# Patient Record
Sex: MALE | Race: BLACK OR AFRICAN AMERICAN
[De-identification: names, ages, dates, MRNs, and addresses within clinical notes are randomized per-mention and may not be internally consistent; named-entity substitution may affect disease eponyms.]

---

## 2018-11-19 LAB
ALBUMIN SERPL-MCNC: 3.9 G/DL (ref 3.4–5)
ALP SERPL-CCNC: 79 U/L (ref 45–117)
ALT SERPL-CCNC: 17 U/L (ref 16–61)
ANION GAP SERPL CALCULATED.3IONS-SCNC: 3 MMOL/L (ref 5–15)
APTT PPP: 27.3 SEC (ref 23.78–33.04)
BILIRUB SERPL-MCNC: 0.7 MG/DL (ref 0.2–1)
BUN SERPL-MCNC: 18 MG/DL (ref 7–18)
BUN/CREAT SERPL: 20.2
CALCIUM SERPL-MCNC: 9.1 MG/DL (ref 8.5–10.1)
CHLORIDE SERPL-SCNC: 102 MMOL/L (ref 98–107)
CO2 SERPL-SCNC: 32 MMOL/L (ref 21–32)
GLUCOSE SERPL-MCNC: 102 MG/DL (ref 74–106)
HCT VFR BLD AUTO: 47.2 % (ref 41–53)
HGB BLD-MCNC: 15.6 G/DL (ref 13.5–17.5)
INR PPP: 0.88 (ref 0.9–1.15)
MCH RBC QN AUTO: 32.7 PG (ref 28–32)
MCV RBC AUTO: 98.8 FL (ref 80–100)
POTASSIUM SERPL-SCNC: 4.6 MMOL/L (ref 3.5–5.1)
PROT SERPL-MCNC: 6.9 G/DL (ref 6.4–8.2)
PROTHROMBIN TIME: 9.5 SEC (ref 9.27–12.13)
SODIUM SERPL-SCNC: 137 MMOL/L (ref 136–145)

## 2018-11-21 ENCOUNTER — HOSPITAL ENCOUNTER (OUTPATIENT)
Dept: HOSPITAL 15 - SUR | Age: 58
Discharge: HOME | End: 2018-11-21
Attending: PODIATRIST
Payer: COMMERCIAL

## 2018-11-21 VITALS — WEIGHT: 135 LBS | HEIGHT: 67 IN | BODY MASS INDEX: 21.19 KG/M2

## 2018-11-21 DIAGNOSIS — D23.72: Primary | ICD-10-CM

## 2018-11-21 DIAGNOSIS — Z79.84: ICD-10-CM

## 2018-11-21 DIAGNOSIS — Z98.890: ICD-10-CM

## 2018-11-21 DIAGNOSIS — Z88.6: ICD-10-CM

## 2018-11-21 DIAGNOSIS — Z79.899: ICD-10-CM

## 2018-11-21 DIAGNOSIS — Z79.1: ICD-10-CM

## 2018-11-21 DIAGNOSIS — Z80.0: ICD-10-CM

## 2018-11-21 DIAGNOSIS — E11.9: ICD-10-CM

## 2018-11-21 DIAGNOSIS — J44.9: ICD-10-CM

## 2018-11-21 DIAGNOSIS — Z85.6: ICD-10-CM

## 2018-11-21 PROCEDURE — 11423 EXC H-F-NK-SP B9+MARG 2.1-3: CPT

## 2018-11-21 PROCEDURE — 85027 COMPLETE CBC AUTOMATED: CPT

## 2018-11-21 PROCEDURE — 82962 GLUCOSE BLOOD TEST: CPT

## 2018-11-21 PROCEDURE — 85007 BL SMEAR W/DIFF WBC COUNT: CPT

## 2018-11-21 PROCEDURE — 93005 ELECTROCARDIOGRAM TRACING: CPT

## 2018-11-21 PROCEDURE — 81001 URINALYSIS AUTO W/SCOPE: CPT

## 2018-11-21 PROCEDURE — 80053 COMPREHEN METABOLIC PANEL: CPT

## 2018-11-21 PROCEDURE — 85730 THROMBOPLASTIN TIME PARTIAL: CPT

## 2018-11-21 PROCEDURE — 85610 PROTHROMBIN TIME: CPT

## 2018-11-21 PROCEDURE — 36415 COLL VENOUS BLD VENIPUNCTURE: CPT

## 2018-11-28 VITALS — DIASTOLIC BLOOD PRESSURE: 86 MMHG | SYSTOLIC BLOOD PRESSURE: 119 MMHG

## 2024-12-27 ENCOUNTER — HOSPITAL ENCOUNTER (INPATIENT)
Dept: HOSPITAL 15 - ER | Age: 64
LOS: 4 days | Discharge: HOME | DRG: 871 | End: 2024-12-31
Attending: INTERNAL MEDICINE | Admitting: STUDENT IN AN ORGANIZED HEALTH CARE EDUCATION/TRAINING PROGRAM
Payer: COMMERCIAL

## 2024-12-27 VITALS
OXYGEN SATURATION: 94 % | TEMPERATURE: 98.2 F | SYSTOLIC BLOOD PRESSURE: 98 MMHG | DIASTOLIC BLOOD PRESSURE: 64 MMHG | HEART RATE: 92 BPM | RESPIRATION RATE: 24 BRPM

## 2024-12-27 VITALS — OXYGEN SATURATION: 94 % | HEART RATE: 94 BPM | RESPIRATION RATE: 24 BRPM

## 2024-12-27 VITALS — OXYGEN SATURATION: 97 % | RESPIRATION RATE: 20 BRPM | HEART RATE: 82 BPM

## 2024-12-27 VITALS — HEART RATE: 98 BPM | RESPIRATION RATE: 25 BRPM | OXYGEN SATURATION: 95 %

## 2024-12-27 VITALS — OXYGEN SATURATION: 98 % | RESPIRATION RATE: 18 BRPM | HEART RATE: 85 BPM

## 2024-12-27 VITALS — HEIGHT: 67 IN | BODY MASS INDEX: 19.38 KG/M2 | WEIGHT: 123.46 LBS

## 2024-12-27 DIAGNOSIS — F17.210: ICD-10-CM

## 2024-12-27 DIAGNOSIS — J15.9: ICD-10-CM

## 2024-12-27 DIAGNOSIS — E78.5: ICD-10-CM

## 2024-12-27 DIAGNOSIS — M19.90: ICD-10-CM

## 2024-12-27 DIAGNOSIS — J12.82: ICD-10-CM

## 2024-12-27 DIAGNOSIS — Z99.81: ICD-10-CM

## 2024-12-27 DIAGNOSIS — J44.0: ICD-10-CM

## 2024-12-27 DIAGNOSIS — J96.22: ICD-10-CM

## 2024-12-27 DIAGNOSIS — Z79.899: ICD-10-CM

## 2024-12-27 DIAGNOSIS — R73.03: ICD-10-CM

## 2024-12-27 DIAGNOSIS — E55.9: ICD-10-CM

## 2024-12-27 DIAGNOSIS — J44.1: ICD-10-CM

## 2024-12-27 DIAGNOSIS — J96.21: ICD-10-CM

## 2024-12-27 DIAGNOSIS — Z88.6: ICD-10-CM

## 2024-12-27 DIAGNOSIS — D64.9: ICD-10-CM

## 2024-12-27 DIAGNOSIS — A41.89: Primary | ICD-10-CM

## 2024-12-27 DIAGNOSIS — J15.69: ICD-10-CM

## 2024-12-27 DIAGNOSIS — U07.1: ICD-10-CM

## 2024-12-27 DIAGNOSIS — I10: ICD-10-CM

## 2024-12-27 LAB
ANION GAP SERPL CALCULATED.3IONS-SCNC: 3 MMOL/L (ref 5–15)
BUN SERPL-MCNC: 10 MG/DL (ref 9–23)
BUN/CREAT SERPL: 11.1 (ref 10–20)
CALCIUM SERPL-MCNC: 9.7 MG/DL (ref 8.7–10.4)
CHLORIDE SERPL-SCNC: 93 MMOL/L (ref 98–107)
CO2 SERPL-SCNC: 34 MMOL/L (ref 20–31)
GLUCOSE SERPL-MCNC: 115 MG/DL (ref 74–106)
HCT VFR BLD AUTO: 37.8 % (ref 41–53)
HGB BLD-MCNC: 12.3 G/DL (ref 13.5–17.5)
MCH RBC QN AUTO: 29.7 PG (ref 28–32)
MCV RBC AUTO: 91.2 FL (ref 80–100)
NRBC BLD QL AUTO: 0.1 %
POTASSIUM SERPL-SCNC: 4.8 MMOL/L (ref 3.5–5.1)
SARS-COV+SARS-COV-2 AG RESP QL IA.RAPID: POSITIVE
SODIUM SERPL-SCNC: 130 MMOL/L (ref 136–145)

## 2024-12-27 PROCEDURE — 83036 HEMOGLOBIN GLYCOSYLATED A1C: CPT

## 2024-12-27 PROCEDURE — 94644 CONT INHLJ TX 1ST HOUR: CPT

## 2024-12-27 PROCEDURE — 36415 COLL VENOUS BLD VENIPUNCTURE: CPT

## 2024-12-27 PROCEDURE — 83880 ASSAY OF NATRIURETIC PEPTIDE: CPT

## 2024-12-27 PROCEDURE — 87205 SMEAR GRAM STAIN: CPT

## 2024-12-27 PROCEDURE — 87070 CULTURE OTHR SPECIMN AEROBIC: CPT

## 2024-12-27 PROCEDURE — 87081 CULTURE SCREEN ONLY: CPT

## 2024-12-27 PROCEDURE — 80076 HEPATIC FUNCTION PANEL: CPT

## 2024-12-27 PROCEDURE — 81001 URINALYSIS AUTO W/SCOPE: CPT

## 2024-12-27 PROCEDURE — 80048 BASIC METABOLIC PNL TOTAL CA: CPT

## 2024-12-27 PROCEDURE — 85379 FIBRIN DEGRADATION QUANT: CPT

## 2024-12-27 PROCEDURE — 87040 BLOOD CULTURE FOR BACTERIA: CPT

## 2024-12-27 PROCEDURE — 85025 COMPLETE CBC W/AUTO DIFF WBC: CPT

## 2024-12-27 PROCEDURE — 71045 X-RAY EXAM CHEST 1 VIEW: CPT

## 2024-12-27 PROCEDURE — 93970 EXTREMITY STUDY: CPT

## 2024-12-27 PROCEDURE — 80307 DRUG TEST PRSMV CHEM ANLYZR: CPT

## 2024-12-27 PROCEDURE — 82306 VITAMIN D 25 HYDROXY: CPT

## 2024-12-27 PROCEDURE — 94640 AIRWAY INHALATION TREATMENT: CPT

## 2024-12-27 PROCEDURE — 87426 SARSCOV CORONAVIRUS AG IA: CPT

## 2024-12-27 PROCEDURE — 84443 ASSAY THYROID STIM HORMONE: CPT

## 2024-12-27 PROCEDURE — 80320 DRUG SCREEN QUANTALCOHOLS: CPT

## 2024-12-27 PROCEDURE — 84484 ASSAY OF TROPONIN QUANT: CPT

## 2024-12-27 PROCEDURE — 87804 INFLUENZA ASSAY W/OPTIC: CPT

## 2024-12-27 PROCEDURE — 80053 COMPREHEN METABOLIC PANEL: CPT

## 2024-12-27 PROCEDURE — 97163 PT EVAL HIGH COMPLEX 45 MIN: CPT

## 2024-12-27 RX ADMIN — IPRATROPIUM BROMIDE ONE MG: 0.5 SOLUTION RESPIRATORY (INHALATION) at 11:40

## 2024-12-27 RX ADMIN — ALBUTEROL SULFATE ONE MG: 2.5 SOLUTION RESPIRATORY (INHALATION) at 11:40

## 2024-12-27 RX ADMIN — METHYLPREDNISOLONE SODIUM SUCCINATE ONE MG: 125 INJECTION, POWDER, FOR SOLUTION INTRAMUSCULAR; INTRAVENOUS at 11:39

## 2024-12-27 RX ADMIN — ALBUTEROL SULFATE SCH MG: 2.5 SOLUTION RESPIRATORY (INHALATION) at 22:46

## 2024-12-27 RX ADMIN — FUROSEMIDE ONE MG: 10 INJECTION, SOLUTION INTRAMUSCULAR; INTRAVENOUS at 16:02

## 2024-12-27 RX ADMIN — IPRATROPIUM BROMIDE SCH MG: 0.5 SOLUTION RESPIRATORY (INHALATION) at 22:46

## 2024-12-27 NOTE — DVH
CHEST RADIOGRAPH



Indication: sob



Technique: Single frontal view of the chest was obtained



COMPARISON: None



FINDINGS: 



Lines and Tubes: None



Lungs: Diffuse increased interstitial prominence.



Pleura: No effusion.



No pneumothorax. 



Cardiomediastinal contours: Unremarkable



Bones: Unremarkable



IMPRESSION: 



Pulmonary vascular congestion or viral pneumonitis. 



Electronically Signed by: Rory Sweet at 12/27/2024 12:21:19 PM

## 2024-12-27 NOTE — DVHHPRES
History of Present Illness


Resident Creating Document:  ARISTIDES STEWARD RESIDENT


History of Present Illness





This is a 64 years old male with past medical history of hypertension, leukemia,

COPD with home oxygen, presented to the ED with a chief complaint of shortness 

of breath and productive cough for 3 days prior to this admission.  The patient 

states that he was diagnosed with pneumonia 3 weeks ago and on oral  antibiotics

for 10 days and 1 week ago he was diagnosed with COVID.  He mentioned that for 

last 3 days he has cough with yellowish sputum and shortness of breath getting 

worse that prompted this visit.  He denies chest pain, dizziness, diaphoresis, 

abdomen pain, nausea, vomiting, dysuria, hematuria, fever, malaise, weakness, 

sick contact or any change in bowel movement.


Past Medical History


Hypertension, leukemia, COPD


Past Surgical History


None


Family History


None


Past Social History


Lives with brother


Smokes 4-5 cigarettes per day, drinks 3-4 beer every day for last 10 years and 

never tried any drugs.





Review of Systems


Constitutional:  Yes: Malaise; No: Fever, Chills, Sweats, Weakness, Other


Eyes:  No: Pain, Vision change, Conjunctivae inflammation, Eyelid inflammation, 

Other, Redness


ENT:  No: Ear pain, Ear discharge, Nose pain, Nose discharge, Nose congestion, 

Mouth pain, Mouth swelling, Throat pain, Throat swelling, Other


Respiratory:  Cough, Shortness of breath, Wheezing, Sputum; No: Dry, SOB with 

excertion, Hemoptysis, Pleuritic Pain, Wheezing, Other


Cardiovascular:  No: Chest Pain, Palpitations, Orthopnea, Paroxysmal Noc. 

Dyspnea, Edema, Lt Headedness, Other


Gastrointestinal:  No: Nausea, Vomiting, Abdominal Pain, Diarrhea, Constipation,

Melena, Hematochezia, Other


Genitourinary:  No Dysuria, No Frequency, No Incontinence, No Hematuria, No 

Retention, No Other


Musculoskeletal:  No: other, neck pain, shoulder pain, arm pain, back pain, hand

pain, leg pain, foot pain


Skin:  No: Rash, Lesions, Jaundice, Bruising, Other


Neurological:  No: Weakness, Numbness, Incoordination, Change in speech, 

Confusion, Seizures, Other


Allergies:  


Coded Allergies:  


     Aspirin (Unverified  Allergy, Mild, HIVES, 11/19/18)


Medications





                               Current Medications








 Medications  Dose


 Ordered  Sig/Sherif


 Route  Start Time


 Stop Time Status Last Admin


Dose Admin


 


 Albuterol  2.5 mg  Q4HR


 NEB  12/27/24 22:00


   UNV  





 


 Ipratropium


 Bromide  0.5 mg  Q4HR


 NEB  12/27/24 22:00


   UNV  





 


 Methylprednisolone


 Sodium Succinate  40 mg  BID


 IV  12/27/24 22:00


   UNV  





 


 Ceftriaxone Sodium  50 ml @ 


 100 mls/hr  DAILY


 IV  12/28/24 10:00


   UNV  





 


 Azithromycin  250 ml @ 


 125 mls/hr  DAILY


 IV  12/28/24 10:00


   UNV  





 


 Furosemide  20 mg  DAILY


 IV  12/28/24 10:00


   UNV  














Exam


Vital Signs





Vital Signs








  Date Time  Temp Pulse Resp B/P (MAP) Pulse Ox O2 Delivery O2 Flow Rate FiO2


 


12/27/24 19:45 98.2 92 24 98/64 (75) 94   





 98.2       


 


12/27/24 19:45      Nasal Cannula* 4 36








Exam








Physical examination:








General Appearance:  Alert, Oriented X3, Cooperative, No acute distress


HEENT:  Atraumatic, PERRLA, EOMI, Mucous membrane moist/pink


Respiratory:  Left-sided decreased breath sounds, right-sided wheezing and 

crackles. 


Cardiovascular:  Regular rate, Normal S1, Normal S2, No murmurs, no chest wall 

tenderness


Abdominal:  Normal bowel sounds, Soft, No tenderness, No hepatospenomegaly, No 

masses


Extremities:  No clubbing, No cyanosis, No edema, Normal pulses, No 

tenderness/swelling


Skin:  No rashes, No breakdown, No significant lesion


Neuro:  Normal gait, Normal speech, Strength at 5/5 X4 ext, Normal tone, 

Sensation intact, grossly intact cranial nerves.


Psych/Mental Status:  Mental status NL, Mood NL





Labs/Xrays





                                      Labs








Test


 12/27/24


20:00 12/27/24


16:05 12/27/24


15:22 12/27/24


12:08 Range/Units


 


 


Urine Color Light-yellow     Yellow  


 


Urine Clarity Clear     Clear  


 


Urine pH 5.5     5.0-9.0  


 


Urine Specific Gravity 1.007     1.001-1.035  


 


Urine Protein Negative     Negative  


 


Urine Ketones Negative     Negative  


 


Urine Blood Negative     Negative  /uL


 


Urine Nitrite Negative     Negative  


 


Urine Bilirubin Negative     Negative  


 


Urine Urobilinogen Normal     Negative  mg/dL


 


Urine Leukocyte Esterase Negative     Negative  /uL


 


Urine RBC <1     0 - 3  /hpf


 


Urine WBC <1     0 - 3  /hpf


 


Urine Squamous Epithelial


Cells Few 


 


 


 


 <5  /hpf





 


Urine Bacteria None seen     None Seen  /hpf


 


Urine Mucus Few     None Seen  


 


Urine Glucose Normal     Normal  mg/dL


 


Influenza Type A Antigen  Negative    Negative  


 


Influenza Type B Antigen  Negative    Negative  


 


SARS-CoV-2 Antigen (Rapid)  Positive *A   NEGATIVE  


 


Troponin I High Sensitivity   12   </=54  ng/L


 


White Blood Count


 


 


 


 12.0 H


 4.4-10.8


10^3/uL


 


Red Blood Count


 


 


 


 4.14 L


 4.5-5.90


10^6/uL


 


Hemoglobin    12.3 L 13.5-17.5  g/dL


 


Hematocrit    37.8 L 41.0-53.0  %


 


Mean Corpuscular Volume    91.2  80.0-100.0  fL


 


Mean Corpuscular Hemoglobin    29.7  28.0-32.0  pg


 


Mean Corpuscular Hemoglobin


Concent 


 


 


 32.5 


 32.0-36.0  g/dL





 


Red Cell Distribution Width    13.9  11.8-14.3  %


 


Platelet Count


 


 


 


 430 


 140-450


10^3/uL


 


Mean Platelet Volume    6.9  6.9-10.8  fL


 


Neutrophils (%) (Auto)    76.0  37.0-80.0  %


 


Lymphocytes (%) (Auto)    14.9  10.0-50.0  %


 


Monocytes (%) (Auto)    8.7  0.0-12.0  %


 


Eosinophils (%) (Auto)    0.2  0.0-7.0  %


 


Basophils (%) (Auto)    0.2  0.0-2.0  %


 


Neutrophils # (Auto)


 


 


 


 9.1 H


 1.6-8.6  10


^3/uL


 


Lymphocytes # (Auto)


 


 


 


 1.8 


 0.4-5.4  10


^3/uL


 


Monocytes # (Auto)    1.0  0-1.3  10 ^3/uL


 


Eosinophils # (Auto)    0  0-0.8  10 ^3/uL


 


Basophils # (Auto)    0  0-0.2  10 ^3/uL


 


Nucleated Red Blood Cells    0.1   %


 


Sodium Level    130 L 136-145  mmol/L


 


Potassium Level    4.8  3.5-5.1  mmol/L


 


Chloride Level    93 L   mmol/L


 


Carbon Dioxide Level    34 H 20-31  mmol/L


 


Anion Gap    3 L 5-15  


 


Blood Urea Nitrogen    10  9-23  mg/dL


 


Creatinine


 


 


 


 0.90 


 0.700-1.30


mg/dL


 


Glomerular Filtration Rate


Calc 


 


 


 96 


 >90  mL/min





 


BUN/Creatinine Ratio    11.1  10.0-20.0  


 


Serum Glucose    115 H   mg/dL


 


Calcium Level    9.7  8.7-10.4  mg/dL


 


B-Type Natriuretic Peptide    60.03  0-100  pg/mL











Assessment/Plan


Assessment/Plan





Assessment and plan:





# Acute on chronic respiratory failure due to Covid pneumonia


- Patient  is on 4 L oxygen with saturation 97% .


- IV Decadron 6 mg daily


- Remdesivir po per pharmacy


- Vitamin C 500 mg and Zinc 20 mg p.o. daily





# Possible secondary Gram-positive versus Gram-negative pneumonia


- IV ceftriaxone 1 g daily and IV azithromycin 500 mg daily


- Ordered sputum C/S





# Acute exacerbation of chronic COPD


- IV Decadron 6 mg daily


- Duoneb with albuterol and ipratropium q.4 hours





# Prediabetes, HbA1C 6.3


- Counseled patient regarding low carb diet, lifestyle modification and physical

exercise





# PUD prophylaxis


- Pepcid 20 mg p.o. daily





# DVT prophylaxis


- Lovenox 40 mg sc daily





Goal of care discussed with the patient for more than 17 minutes full code





Plan discussed with Dr. Rudd


Plan discussed with:  Patient, Other


My Orders





                        Orders - ARISTIDES STEWARD RESIDENT








Procedure Category Date Status





  Time 


 


Admit ADMIT 12/27/24 Transmitted





  21:19 


 


Albuterol Medneb PHA 12/27/24 Logged





 (Ventolin Medneb)  22:00 


 


Ipratropium Medneb PHA 12/27/24 Logged





 (Atrovent Medneb)  22:00 


 


Methylprednisolone PHA 12/27/24 Logged





Sod Succ (Solu Medrol  22:00 


 


Ceftriaxone 1gm/50ml PHA 12/28/24 Logged





D5w (Rocephin)  10:00 


 


Azithromycin  500mg/ PHA 12/28/24 Logged





250ml (Zithromax 50  10:00 


 


Furosemide Injection PHA 12/28/24 Logged





 (Lasix Injection)  10:00 


 


Blood Alcohol LAB 12/27/24 Logged





  21:23 


 


Drug Screen LAB 12/27/24 Logged





  21:23 


 


Hemoglobin A1c LAB 12/27/24 Logged





  21:23 


 


Thyroid Stimulating LAB 12/27/24 Logged





Hormone  21:23 











Date of Service:  Dec 27, 2024


Billing Provider:  MICHAEL RUDD MD


Common Visit Codes:  99223-INITIAL  INP/OBS CARE (HIGH)











ARISTIDES STEWARD RESIDENT        Dec 27, 2024 21:31


MICHAEL RUDD MD            Dec 28, 2024 10:47

## 2024-12-28 VITALS — OXYGEN SATURATION: 97 % | HEART RATE: 92 BPM | RESPIRATION RATE: 18 BRPM

## 2024-12-28 VITALS — OXYGEN SATURATION: 98 % | RESPIRATION RATE: 24 BRPM | HEART RATE: 94 BPM

## 2024-12-28 VITALS
SYSTOLIC BLOOD PRESSURE: 105 MMHG | TEMPERATURE: 98.3 F | OXYGEN SATURATION: 95 % | RESPIRATION RATE: 19 BRPM | HEART RATE: 91 BPM | DIASTOLIC BLOOD PRESSURE: 58 MMHG

## 2024-12-28 VITALS
DIASTOLIC BLOOD PRESSURE: 56 MMHG | HEART RATE: 86 BPM | RESPIRATION RATE: 1 BRPM | TEMPERATURE: 98.2 F | SYSTOLIC BLOOD PRESSURE: 102 MMHG | OXYGEN SATURATION: 93 %

## 2024-12-28 VITALS — HEART RATE: 90 BPM | RESPIRATION RATE: 20 BRPM | OXYGEN SATURATION: 99 %

## 2024-12-28 VITALS — RESPIRATION RATE: 18 BRPM | OXYGEN SATURATION: 95 % | HEART RATE: 96 BPM

## 2024-12-28 VITALS
HEART RATE: 86 BPM | DIASTOLIC BLOOD PRESSURE: 56 MMHG | TEMPERATURE: 98.2 F | SYSTOLIC BLOOD PRESSURE: 102 MMHG | OXYGEN SATURATION: 93 % | RESPIRATION RATE: 19 BRPM

## 2024-12-28 VITALS — HEART RATE: 91 BPM | OXYGEN SATURATION: 95 % | RESPIRATION RATE: 19 BRPM

## 2024-12-28 VITALS — OXYGEN SATURATION: 97 % | HEART RATE: 56 BPM | RESPIRATION RATE: 15 BRPM

## 2024-12-28 VITALS — OXYGEN SATURATION: 96 % | RESPIRATION RATE: 18 BRPM | HEART RATE: 93 BPM

## 2024-12-28 VITALS — RESPIRATION RATE: 27 BRPM | OXYGEN SATURATION: 98 % | HEART RATE: 106 BPM

## 2024-12-28 VITALS — HEART RATE: 101 BPM | RESPIRATION RATE: 22 BRPM | OXYGEN SATURATION: 93 %

## 2024-12-28 VITALS — HEART RATE: 97 BPM | RESPIRATION RATE: 18 BRPM | OXYGEN SATURATION: 97 %

## 2024-12-28 VITALS — HEART RATE: 98 BPM | RESPIRATION RATE: 18 BRPM | OXYGEN SATURATION: 95 %

## 2024-12-28 VITALS — RESPIRATION RATE: 20 BRPM | HEART RATE: 94 BPM | OXYGEN SATURATION: 96 %

## 2024-12-28 VITALS — RESPIRATION RATE: 24 BRPM | OXYGEN SATURATION: 92 % | HEART RATE: 92 BPM

## 2024-12-28 VITALS — OXYGEN SATURATION: 93 %

## 2024-12-28 VITALS — OXYGEN SATURATION: 96 % | RESPIRATION RATE: 22 BRPM | HEART RATE: 98 BPM

## 2024-12-28 VITALS — RESPIRATION RATE: 18 BRPM | HEART RATE: 95 BPM | OXYGEN SATURATION: 98 %

## 2024-12-28 LAB
ALBUMIN SERPL-MCNC: 3.7 G/DL (ref 3.2–4.8)
ALBUMIN SERPL-MCNC: 3.7 G/DL (ref 3.2–4.8)
ALP SERPL-CCNC: 98 U/L (ref 46–116)
ALP SERPL-CCNC: 98 U/L (ref 46–116)
ALT SERPL-CCNC: < 9 U/L (ref 7–40)
ALT SERPL-CCNC: < 9 U/L (ref 7–40)
ANION GAP SERPL CALCULATED.3IONS-SCNC: 4 MMOL/L (ref 5–15)
ANION GAP SERPL CALCULATED.3IONS-SCNC: 5 MMOL/L (ref 5–15)
BILIRUB DIRECT SERPL-MCNC: 0.1 MG/DL (ref ?–0.3)
BILIRUB SERPL-MCNC: 0.3 MG/DL (ref 0.2–1)
BILIRUB SERPL-MCNC: 0.3 MG/DL (ref 0.2–1)
BUN SERPL-MCNC: 15 MG/DL (ref 9–23)
BUN SERPL-MCNC: 16 MG/DL (ref 9–23)
BUN/CREAT SERPL: 17.6 (ref 10–20)
BUN/CREAT SERPL: 18 (ref 10–20)
CALCIUM SERPL-MCNC: 9.3 MG/DL (ref 8.7–10.4)
CALCIUM SERPL-MCNC: 9.5 MG/DL (ref 8.7–10.4)
CHLORIDE SERPL-SCNC: 95 MMOL/L (ref 98–107)
CHLORIDE SERPL-SCNC: 95 MMOL/L (ref 98–107)
CO2 SERPL-SCNC: 33 MMOL/L (ref 20–31)
CO2 SERPL-SCNC: 35 MMOL/L (ref 20–31)
GLUCOSE SERPL-MCNC: 116 MG/DL (ref 74–106)
GLUCOSE SERPL-MCNC: 119 MG/DL (ref 74–106)
HCT VFR BLD AUTO: 35.3 % (ref 41–53)
HGB BLD-MCNC: 11.8 G/DL (ref 13.5–17.5)
MCH RBC QN AUTO: 30.4 PG (ref 28–32)
MCV RBC AUTO: 90.7 FL (ref 80–100)
NRBC BLD QL AUTO: 0 %
POTASSIUM SERPL-SCNC: 4.6 MMOL/L (ref 3.5–5.1)
POTASSIUM SERPL-SCNC: 4.6 MMOL/L (ref 3.5–5.1)
PROT SERPL-MCNC: 5.8 G/DL (ref 5.7–8.2)
PROT SERPL-MCNC: 5.8 G/DL (ref 5.7–8.2)
SODIUM SERPL-SCNC: 133 MMOL/L (ref 136–145)
SODIUM SERPL-SCNC: 134 MMOL/L (ref 136–145)

## 2024-12-28 RX ADMIN — FAMOTIDINE SCH MG: 20 TABLET, FILM COATED ORAL at 10:35

## 2024-12-28 RX ADMIN — OXYCODONE HYDROCHLORIDE AND ACETAMINOPHEN SCH MG: 500 TABLET ORAL at 10:36

## 2024-12-28 RX ADMIN — ENOXAPARIN SODIUM SCH MG: 40 INJECTION SUBCUTANEOUS at 10:35

## 2024-12-28 RX ADMIN — AZITHROMYCIN DIHYDRATE SCH MLS/HR: 500 INJECTION, POWDER, LYOPHILIZED, FOR SOLUTION INTRAVENOUS at 10:36

## 2024-12-28 RX ADMIN — ZINC SULFATE CAP 220 MG (50 MG ELEMENTAL ZN) SCH MG: 220 (50 ZN) CAP at 10:40

## 2024-12-28 RX ADMIN — CEFTRIAXONE SODIUM SCH MLS/HR: 1 INJECTION, POWDER, FOR SOLUTION INTRAMUSCULAR; INTRAVENOUS at 10:41

## 2024-12-28 NOTE — DVHPNRES
Progress Note


Date Seen:  Dec 28, 2024


Resident Creating Document:  CRISTI CASTELLANO RESIDENT


Has the PT tested + for MRSA


If YES, has PT been informed?:  No





Medical Necessity Reason


Pt with a Central, PICC or Fol:  No





Subjective


Review of Systems


Overnight hemodynamically stable, patient breathing in the room air, patient is 

ambulatory, no acute distress.  Patient refuse scheduled albuterol.  Remains on 

ceftriaxone, vancomycin, azithromycin.  CBC is stable, CMP WNL blood cultures 

still showing positive cocci in clusters in 2 bottles repeat cultures pending


Patient reports:  No new complaints, Feels better


Changes from previous H/P or p:  No Changes





Objective


vital signs





                                   Vital Sign








  Date Time  Temp Pulse Resp B/P (MAP) Pulse Ox O2 Delivery O2 Flow Rate FiO2


 


12/28/24 09:00  104 18 92/47 (62) 94   


 


12/28/24 07:28 98.4       





 98.4       


 


12/28/24 07:28      Nasal Cannula* 4 36








medications





                               Current Medications








 Medications  Dose


 Ordered  Sig/Sherif


 Route  Start Time


 Stop Time Status Last Admin


Dose Admin


 


 Albuterol  2.5 mg  Q4HR


 NEB  12/27/24 22:00


    12/28/24 07:18


2.5 MG


 


 Ipratropium


 Bromide  0.5 mg  Q4HR


 NEB  12/27/24 22:00


    12/28/24 07:18


0.5 MG


 


 Ceftriaxone Sodium  50 ml @ 


 100 mls/hr  DAILY


 IV  12/28/24 10:00


     





 


 Azithromycin  250 ml @ 


 125 mls/hr  DAILY


 IV  12/28/24 10:00


     





 


 Ascorbic Acid  500 mg  DAILY


 PO  12/28/24 10:00


     





 


 Zinc Sulfate  220 mg  DAILY


 PO  12/28/24 10:00


     





 


 Dexamethasone


 Sodium Phosphate  6 mg  DAILY


 IV  12/28/24 10:00


     





 


 Remdesivir  0 ml @ 0


 mls/hr  PER  PHARMACY


 IV  12/27/24 23:30


 12/29/24 23:31   





 


 Famotidine  20 mg  DAILY


 PO  12/28/24 10:00


     





 


 Enoxaparin Sodium  40 mg  DAILY


 SC  12/28/24 10:00


     





 


 Remdesivir 100 mg/


 Sodium Chloride  250 ml @ 


 250 mls/hr  DAILY@1500


 IV  12/29/24 15:00


 12/30/24 15:59   











Examination


General Appearance:  Alert, Oriented X3, Cooperative, No acute distress


HEENT:  Atraumatic, PERRLA, EOMI, Mucous membrane moist/pink


Respiratory:  Left-sided decreased breath sounds, right-sided wheezing and 

crackles.  Improved.


Cardiovascular:  Regular rate, Normal S1, Normal S2, No murmurs, no chest wall 

tenderness


Abdominal:  Normal bowel sounds, Soft, No tenderness, No hepatospenomegaly, No 

masses


Extremities:  No clubbing, No cyanosis, No edema, Normal pulses, No 

tenderness/swelling


Skin:  No rashes, No breakdown, No significant lesion


Neuro:  Normal gait, Normal speech, Strength at 5/5 X4 ext, Normal tone, 

Sensation intact, grossly intact cranial nerves.


Psych/Mental Status:  Mental status NL, Mood NL


laboratory and microbiology


                                Laboratory Tests


12/28/24 07:18

















Test


 12/28/24


07:18 Range/Units


 


 


Serum Glucose Pending   








Labs and/or images reviewed:  Labs reviewed by me, Image(s) reviewed by me





Problem List/Assessment/Plan


Problem List/Assessment/Plan


Hospitalization summary/ Assesment: The patient is a 64-year-old male with a 

history of hypertension, leukemia, and COPD requiring home oxygen. He presented 

to the ED with shortness of breath and a productive cough for the past three 

days. He was diagnosed with pneumonia three weeks ago and completed a 10-day 

course of oral antibiotics. One week ago, he was diagnosed with COVID-19. Over 

the last three days, his cough has produced yellowish sputum, and his shortness 

of breath has worsened, prompting this visit. He denies chest pain, dizziness, 

diaphoresis, abdominal pain, nausea, vomiting, dysuria, hematuria, fever, 

malaise, weakness, sick contacts, or changes in bowel movements. He lives with 

his brother, smokes 4-5 cigarettes daily, and drinks 3-4 beers every day for the

past 10 years. He has no surgical or family history and has never used drugs.





Plan: 


#  Patient with history of leukemia on ibrutinib 420 mg tablet daily.


#  COVID pneumonia, moderately severe, hypoxic symptomatic


#  Acute on chronic respiratory failure due to Covid pneumonia: Decadron, 

remdesivir, vitamin-C and zinc to follow:  wean oxygen as tolerated target SpO2 

88-92%


#   possible secondary Gram-positive versus Gram-negative pneumonia:  IV 

ceftriaxone azithromycin to continue sputum culture sensitivity to follow


#  sepsis secondary due to community-acquired pneumonia/ COVID:  Sepsis protocol

continued.


#  Acute exacerbation of chronic COPD:  At home takes albuterol, Spiriva, 

fluticasone,Duoneb with albuterol and ipratropium q.4 hours


# Prediabetes, HbA1C 6.3 Counseled patient regarding low carb diet, lifestyle 

modification and physical exercise


# essential hypertension amlodipine 5, lisinopril 40 mg daily


# nicotine patch for nicotine withdrawal


# vitamin-D deficiency


# dyslipidemia on simvastatin 40 mg daily


# Ruled out PE/DVT: D-dimer,  Use DVT


# osteoarthritis as needed acetaminophen diclofenac sodium gel locally, 

tizanidine 2 g tablet


# cervical pain due to osteoarthritis Norco 10 t.i.d..





Diet:  regular diet


GI prophylaxis: protonix 40mg/Famotidine 20/not needed 


DVT prophylaxis:  Levonox 40mg/SCD/brisk movement/ 


Bowel regimen:   MiraLax as needed


Barriers to discharge:   Medical diagnosis and managment in progress. Patient 

lives with self / family. Independent/need supportive device/wheelchair/person 

support for ADL.  PT and SW consult as needed. 


PCP:  Raymundo Arzate


Specialist Relevent To Admission:   none





full code: discussion needed  Total 37 minutes of detailed discussion


Patient care and plan discussed with Dr. Perez


Disposition: Patient remains in ICU / [facility] / MED-SURG / TELE


Plan discussed with:  Patient, Other (Primary team, RN)





My Orders


My Orders





                         Orders - CRISTI CASTELLANO








Procedure Category Date Status





  Time 


 


Comprehensive LAB 12/28/24 In Process





Metabolic Panel  09:54 


 


D-Dimer LAB 12/28/24 In Process





  09:56 


 


Bilat Lower Dvt US 12/28/24 Logged





  09:56 

















CRISTI CASTELLANO          Dec 28, 2024 10:12

## 2024-12-28 NOTE — DV[facility]
Bilateral lower extremity venous duplex



Clinical History: High risk of DVT / PE



Comparison: None



Technique: 



Duplex doppler evaluation of the deep venous systems of both lower extremities from the common femora
l veins to the popliteal veins including color doppler and spectral/pulsed waveform analysis was perf
ormed.



Findings: 



RIGHT SIDE: 



The common femoral vein demonstrates appropriate compressibility and waveform variability.



There is compressibility/patency of the great saphenous vein at the proximal thigh.



The femoral vein demonstrates appropriate compressibility and waveform variability.



The deep femoral vein demonstrates appropriate compressibility and waveform variability.



The popliteal vein demonstrates appropriate compressibility and waveform variability.



There is normal compressibility at the tibioperoneal trunk.



LEFT SIDE: 



The common femoral vein demonstrates appropriate compressibility and waveform variability.



There is compressibility/patency of the great saphenous vein at the proximal thigh.



The femoral vein demonstrates appropriate compressibility and waveform variability.



The deep femoral vein demonstrates appropriate compressibility and waveform variability.



The popliteal vein demonstrates appropriate compressibility and waveform variability.



There is normal compressibility at the tibioperoneal trunk.



Impression: 



1. No right or left femoropopliteal venous thrombosis.



 



Electronically Signed by: Deni Cruz at 12/28/2024 11:17:02 AM

## 2024-12-29 VITALS — HEART RATE: 78 BPM | RESPIRATION RATE: 14 BRPM | OXYGEN SATURATION: 98 %

## 2024-12-29 VITALS — OXYGEN SATURATION: 94 %

## 2024-12-29 VITALS
RESPIRATION RATE: 18 BRPM | SYSTOLIC BLOOD PRESSURE: 111 MMHG | OXYGEN SATURATION: 98 % | TEMPERATURE: 98.1 F | HEART RATE: 78 BPM | DIASTOLIC BLOOD PRESSURE: 73 MMHG

## 2024-12-29 VITALS
SYSTOLIC BLOOD PRESSURE: 93 MMHG | DIASTOLIC BLOOD PRESSURE: 58 MMHG | OXYGEN SATURATION: 94 % | TEMPERATURE: 98.3 F | HEART RATE: 95 BPM | RESPIRATION RATE: 19 BRPM

## 2024-12-29 VITALS
HEART RATE: 106 BPM | TEMPERATURE: 98 F | RESPIRATION RATE: 19 BRPM | DIASTOLIC BLOOD PRESSURE: 62 MMHG | SYSTOLIC BLOOD PRESSURE: 111 MMHG | OXYGEN SATURATION: 92 %

## 2024-12-29 VITALS
DIASTOLIC BLOOD PRESSURE: 72 MMHG | RESPIRATION RATE: 19 BRPM | SYSTOLIC BLOOD PRESSURE: 109 MMHG | HEART RATE: 80 BPM | TEMPERATURE: 98 F | OXYGEN SATURATION: 99 %

## 2024-12-29 VITALS
TEMPERATURE: 98.3 F | OXYGEN SATURATION: 99 % | RESPIRATION RATE: 20 BRPM | HEART RATE: 94 BPM | DIASTOLIC BLOOD PRESSURE: 73 MMHG | SYSTOLIC BLOOD PRESSURE: 122 MMHG

## 2024-12-29 VITALS — OXYGEN SATURATION: 99 % | RESPIRATION RATE: 19 BRPM | HEART RATE: 80 BPM

## 2024-12-29 VITALS
RESPIRATION RATE: 21 BRPM | TEMPERATURE: 97.4 F | OXYGEN SATURATION: 99 % | HEART RATE: 99 BPM | SYSTOLIC BLOOD PRESSURE: 114 MMHG | DIASTOLIC BLOOD PRESSURE: 70 MMHG

## 2024-12-29 VITALS — HEART RATE: 75 BPM | RESPIRATION RATE: 16 BRPM | OXYGEN SATURATION: 95 %

## 2024-12-29 PROCEDURE — XW033E5 INTRODUCTION OF REMDESIVIR ANTI-INFECTIVE INTO PERIPHERAL VEIN, PERCUTANEOUS APPROACH, NEW TECHNOLOGY GROUP 5: ICD-10-PCS | Performed by: INTERNAL MEDICINE

## 2024-12-29 RX ADMIN — ALBUTEROL SULFATE SCH MCG: 108 AEROSOL, METERED RESPIRATORY (INHALATION) at 06:35

## 2024-12-29 RX ADMIN — MORPHINE SULFATE PRN MG: 2 INJECTION, SOLUTION INTRAMUSCULAR; INTRAVENOUS at 00:09

## 2024-12-29 RX ADMIN — ZOLPIDEM TARTRATE PRN MG: 5 TABLET ORAL at 00:14

## 2024-12-29 RX ADMIN — FUROSEMIDE ONE MG: 10 INJECTION, SOLUTION INTRAMUSCULAR; INTRAVENOUS at 13:38

## 2024-12-30 VITALS — RESPIRATION RATE: 16 BRPM | OXYGEN SATURATION: 100 % | HEART RATE: 87 BPM

## 2024-12-30 VITALS — HEART RATE: 90 BPM | RESPIRATION RATE: 16 BRPM | OXYGEN SATURATION: 95 %

## 2024-12-30 VITALS — RESPIRATION RATE: 18 BRPM | HEART RATE: 79 BPM

## 2024-12-30 VITALS
RESPIRATION RATE: 19 BRPM | OXYGEN SATURATION: 94 % | HEART RATE: 81 BPM | DIASTOLIC BLOOD PRESSURE: 63 MMHG | SYSTOLIC BLOOD PRESSURE: 107 MMHG | TEMPERATURE: 98 F

## 2024-12-30 VITALS — HEART RATE: 78 BPM | RESPIRATION RATE: 18 BRPM | OXYGEN SATURATION: 97 %

## 2024-12-30 VITALS
DIASTOLIC BLOOD PRESSURE: 63 MMHG | OXYGEN SATURATION: 98 % | HEART RATE: 79 BPM | RESPIRATION RATE: 18 BRPM | SYSTOLIC BLOOD PRESSURE: 107 MMHG

## 2024-12-30 VITALS
DIASTOLIC BLOOD PRESSURE: 72 MMHG | RESPIRATION RATE: 20 BRPM | OXYGEN SATURATION: 94 % | HEART RATE: 92 BPM | SYSTOLIC BLOOD PRESSURE: 111 MMHG

## 2024-12-30 VITALS — OXYGEN SATURATION: 95 %

## 2024-12-30 VITALS — HEART RATE: 79 BPM | RESPIRATION RATE: 18 BRPM | OXYGEN SATURATION: 98 %

## 2024-12-30 VITALS
SYSTOLIC BLOOD PRESSURE: 105 MMHG | HEART RATE: 85 BPM | OXYGEN SATURATION: 99 % | DIASTOLIC BLOOD PRESSURE: 63 MMHG | TEMPERATURE: 97.9 F | RESPIRATION RATE: 18 BRPM

## 2024-12-30 VITALS
OXYGEN SATURATION: 95 % | SYSTOLIC BLOOD PRESSURE: 106 MMHG | RESPIRATION RATE: 20 BRPM | TEMPERATURE: 98.4 F | DIASTOLIC BLOOD PRESSURE: 64 MMHG | HEART RATE: 94 BPM

## 2024-12-30 VITALS
TEMPERATURE: 97.8 F | SYSTOLIC BLOOD PRESSURE: 138 MMHG | OXYGEN SATURATION: 95 % | HEART RATE: 87 BPM | DIASTOLIC BLOOD PRESSURE: 75 MMHG | RESPIRATION RATE: 20 BRPM

## 2024-12-30 LAB
ALBUMIN SERPL-MCNC: 3.7 G/DL (ref 3.2–4.8)
ALP SERPL-CCNC: 83 U/L (ref 46–116)
ALT SERPL-CCNC: 11 U/L (ref 7–40)
ANION GAP SERPL CALCULATED.3IONS-SCNC: 2 MMOL/L (ref 5–15)
BILIRUB SERPL-MCNC: 0.2 MG/DL (ref 0.2–1)
BUN SERPL-MCNC: 20 MG/DL (ref 9–23)
BUN/CREAT SERPL: 24.7 (ref 10–20)
CALCIUM SERPL-MCNC: 9.5 MG/DL (ref 8.7–10.4)
CHLORIDE SERPL-SCNC: 95 MMOL/L (ref 98–107)
CO2 SERPL-SCNC: 38 MMOL/L (ref 20–31)
GLUCOSE SERPL-MCNC: 96 MG/DL (ref 74–106)
HCT VFR BLD AUTO: 34.6 % (ref 41–53)
HGB BLD-MCNC: 11.6 G/DL (ref 13.5–17.5)
MCH RBC QN AUTO: 30.5 PG (ref 28–32)
MCV RBC AUTO: 91 FL (ref 80–100)
NRBC BLD QL AUTO: 0 %
POTASSIUM SERPL-SCNC: 4.5 MMOL/L (ref 3.5–5.1)
PROT SERPL-MCNC: 5.5 G/DL (ref 5.7–8.2)
SODIUM SERPL-SCNC: 135 MMOL/L (ref 136–145)

## 2024-12-30 NOTE — DVHPNRES
Progress Note


Date Seen:  Dec 30, 2024


Resident Creating Document:  GIOVANY VELASQUEZ RUPALI


Has the PT tested + for MRSA


If YES, has PT been informed?:  No





Medical Necessity Reason


Pt with a Central, PICC or Fol:  No





Subjective


Review of Systems


This is a 64-year-old male with a past medical history of [specific conditions],

who presented to the ED with a chief complaint of shortness of breath and 

productive cough for 3 days prior to this admission. The patient states that he 

was diagnosed with pneumonia 3 weeks ago and was on oral antibiotics for 10 

days. One week ago, he was diagnosed with COVID. He mentioned that for the last 

3 days, he has had a cough with yellowish sputum and worsening shortness of 

breath, which prompted this visit. He denies chest pain, dizziness, diaphoresis,

abdominal pain, nausea, vomiting, dysuria, hematuria, fever, malaise, weakness, 

sick contacts, or any change in bowel movements.





PMHx:  Hypertension, COPD on home oxygen, leukemia on ibrutinib


PSHx:  Noncontributory


Family history:  Noncontributing


Social history:  Patient lives with the brother has home, smokes 4-6 cigarettes 

daily, drinks 3-4 be seen, denies any other drug use


Home medication:  Ibrutinib, and headache,


Allergic history:  Aspirin





Today, patient seen and examined at the bedside.  Patient is still complaining 

of shortness of breaths and cough.  Patient can maintain saturation at 93% with 

2 L of oxygen through nasal cannula.


Patient reports:  No new complaints, Feels better


Changes from previous H/P or p:  Changes





Objective


vital signs





                                   Vital Sign








  Date Time  Temp Pulse Resp B/P (MAP) Pulse Ox O2 Delivery O2 Flow Rate FiO2


 


12/30/24 13:00  92 20 111/72 (85) 94   


 


12/30/24 11:55      Nasal Cannula 2.0 


 


12/30/24 11:55        28


 


12/30/24 09:00 98.4       





 98.4       














                           Total Intake and Output   


 


 12/29/24 12/29/24 12/30/24





 15:00 23:00 07:00


 


Intake Total 300 ml 1130 ml 900 ml


 


Balance 300 ml 1130 ml 900 ml








medications





                               Current Medications








 Medications  Dose


 Ordered  Sig/Sherif


 Route  Start Time


 Stop Time Status Last Admin


Dose Admin


 


 Ceftriaxone Sodium  50 ml @ 


 100 mls/hr  DAILY


 IV  12/28/24 10:00


    12/30/24 09:56


100 MLS/HR


 


 Azithromycin  250 ml @ 


 125 mls/hr  DAILY


 IV  12/28/24 10:00


    12/30/24 10:00


125 MLS/HR


 


 Ascorbic Acid  500 mg  DAILY


 PO  12/28/24 10:00


    12/30/24 09:56


500 MG


 


 Zinc Sulfate  220 mg  DAILY


 PO  12/28/24 10:00


    12/30/24 09:56


220 MG


 


 Dexamethasone


 Sodium Phosphate  6 mg  DAILY


 IV  12/28/24 10:00


    12/30/24 09:57


6 MG


 


 Famotidine  20 mg  DAILY


 PO  12/28/24 10:00


    12/30/24 09:56


20 MG


 


 Enoxaparin Sodium  40 mg  DAILY


 SC  12/28/24 10:00


    12/30/24 09:57


40 MG


 


 Polyethylene


 Glycol  17 gm  DAILYPRN  PRN


 PO  12/28/24 23:30


     





 


 Guaifenesin  200 mg  Q6HP  PRN


 PO  12/28/24 23:30


    12/30/24 05:35


200 MG


 


 Zolpidem Tartrate  5 mg  HSPRN  PRN


 PO  12/28/24 23:30


    12/29/24 00:14


5 MG


 


 Morphine Sulfate  2 mg  Q2HPRN  PRN


 IV  12/28/24 23:30


    12/29/24 05:44


2 MG


 


 Patient Own


 Medication  1  DAILY


 PO  12/30/24 12:00


    12/30/24 11:36


1


 


 Albuterol  180 mcg  QID


 IN  12/29/24 12:30


    12/29/24 06:35


180 MCG


 


 Docusate Sodium  100 mg  BIDPRN  PRN


 PO  12/29/24 16:30


     











Examination


General Appearance:  Alert, Oriented X3, Cooperative, No acute distress


HEENT:  Atraumatic, PERRLA, EOMI, Mucous membrane moist/pink


Respiratory:  Decreased breath sound, with bilateral lower zone crackles


Cardiovascular:  Regular rate, Normal S1, Normal S2, No murmurs, no chest wall 

tenderness


Abdominal:  Normal bowel sounds, Soft, No tenderness, No hepatospenomegaly, No 

masses


Extremities:  No clubbing, No cyanosis, No edema, Normal pulses, No 

tenderness/swelling


Skin:  No rashes, No breakdown, No significant lesion


Neuro:  Normal gait, Normal speech, Strength at 5/5 X4 ext, Normal tone, 

Sensation intact, Cranial nerves 3-12 NL, Reflexes 2+


Psych/Mental Status:  Mental status NL, Mood NL


laboratory and microbiology


                                Laboratory Tests


12/30/24 08:02

















Test


 12/30/24


08:02 Range/Units


 


 


Serum Glucose 96    mg/dL








                                  Microbiology








 Date/Time


Source Procedure


Growth Status





 


 12/30/24 05:10


Nose MRSA Screen - Final


 Complete


 


 12/29/24 15:25


Blood Blood Culture - Preliminary


NO GROWTH AFTER 24 HOURS OF INCUBATION. Resulted





 12/29/24 10:41


Sputum Expectorated Sputum Gram Stain - Final


 Resulted


 


 12/29/24 10:41


Sputum Expectorated Sputum Respiratory Culture - Preliminary


 Resulted








Labs and/or images reviewed:  Labs reviewed by me, Image(s) reviewed by me





Problem List/Assessment/Plan


Problem List/Assessment/Plan


Acute on chronic hypoxic/hypercarbic respiratory failure, likely due to 

pneumonia/COVID-19/COPD exacerbation 


Sepsis, likely due to COVID-19/pneumonia 


Pneumonia, likely due to Gram-positive Gram-negative/COVID-19


COVID-19 


Acute exacerbation of COPD


Cough, likely due to COVID-19/COPD exacerbation


Chest x-ray shows hyperinflated lung, with prominent bronchovascular marking


Influenza type a and B are negative


Blood, sputum culture are negative


   Empiric antibiotic, ceftriaxone and azithromycin


   Remdesivir 100 mg IV daily and Decadron 6 mg daily 


   Albuterol inhaler q.i.d. 


   Tablet ascorbic acid 500 mg daily, tablet zinc 220 mg p.o. daily


   Guaifenesin 200 mg p.o. q.6 hours p.r.n.


   Oxygen through nasal cannula 2 liters/minute


   MRSA nares is negative


   


   


Prediabetes, Hb A1c is 6.3 








Vitamin-D deficiency, repleted 


Mild hyponatremia, monitoring 





Hypertension 


Continue lisinopril and amlodipine








Current smoker


Patient is consulted for more than 20 minutes for smoking risk and smoking 

cessation nicotine patch








Dyslipidemia


Continue simvastatin 40 mg daily 








Osteoarthritis 


Cervical with the pain, likely due to osteoarthritis 


Norco 10 mg t.i.d. p.r.n.








Ruled out DVT


D-dimer is raised, likely due to COVID-19 


Doppler ultrasound of bilateral lower limb is negative








DIET:  Regular


DVT PROPHYLAXIS:  Lovenox 40 mg


GI PROPHYLAXIS::  Famotidine 20 mg daily


BOWEL REGIMEN:  Colace 100 mg daily as needed


CODE STATUS:  Goal of care discussed for more than 278, full code


DISPOSITION:  [facility]








Patient's status discussed with the patient.





Case discussed with Dr. Perez


Plan discussed with:  Patient, Other (RN)











GIOVANY VELASQUEZ        Dec 30, 2024 17:02

## 2024-12-31 VITALS — OXYGEN SATURATION: 96 % | RESPIRATION RATE: 18 BRPM | HEART RATE: 77 BPM

## 2024-12-31 VITALS
RESPIRATION RATE: 17 BRPM | DIASTOLIC BLOOD PRESSURE: 69 MMHG | SYSTOLIC BLOOD PRESSURE: 107 MMHG | HEART RATE: 79 BPM | OXYGEN SATURATION: 92 % | TEMPERATURE: 97.7 F

## 2024-12-31 VITALS
OXYGEN SATURATION: 94 % | SYSTOLIC BLOOD PRESSURE: 99 MMHG | RESPIRATION RATE: 18 BRPM | DIASTOLIC BLOOD PRESSURE: 63 MMHG | HEART RATE: 83 BPM | TEMPERATURE: 98.6 F

## 2024-12-31 VITALS
TEMPERATURE: 97.6 F | DIASTOLIC BLOOD PRESSURE: 70 MMHG | SYSTOLIC BLOOD PRESSURE: 107 MMHG | HEART RATE: 80 BPM | RESPIRATION RATE: 20 BRPM | OXYGEN SATURATION: 95 %

## 2024-12-31 VITALS — HEART RATE: 80 BPM | RESPIRATION RATE: 16 BRPM | OXYGEN SATURATION: 98 %

## 2024-12-31 VITALS
SYSTOLIC BLOOD PRESSURE: 114 MMHG | TEMPERATURE: 98 F | HEART RATE: 77 BPM | OXYGEN SATURATION: 94 % | RESPIRATION RATE: 16 BRPM | DIASTOLIC BLOOD PRESSURE: 75 MMHG

## 2024-12-31 VITALS
OXYGEN SATURATION: 98 % | HEART RATE: 70 BPM | SYSTOLIC BLOOD PRESSURE: 103 MMHG | TEMPERATURE: 98.1 F | DIASTOLIC BLOOD PRESSURE: 62 MMHG | RESPIRATION RATE: 18 BRPM

## 2024-12-31 VITALS — OXYGEN SATURATION: 94 %

## 2024-12-31 LAB
ALBUMIN SERPL-MCNC: 3.3 G/DL (ref 3.2–4.8)
ALP SERPL-CCNC: 75 U/L (ref 46–116)
ALT SERPL-CCNC: 11 U/L (ref 7–40)
ANION GAP SERPL CALCULATED.3IONS-SCNC: 2 MMOL/L (ref 5–15)
BILIRUB SERPL-MCNC: 0.2 MG/DL (ref 0.2–1)
BUN SERPL-MCNC: 17 MG/DL (ref 9–23)
BUN/CREAT SERPL: 21.8 (ref 10–20)
CALCIUM SERPL-MCNC: 9.4 MG/DL (ref 8.7–10.4)
CHLORIDE SERPL-SCNC: 93 MMOL/L (ref 98–107)
CO2 SERPL-SCNC: 39 MMOL/L (ref 20–31)
GLUCOSE SERPL-MCNC: 96 MG/DL (ref 74–106)
HCT VFR BLD AUTO: 32.4 % (ref 41–53)
HGB BLD-MCNC: 10.7 G/DL (ref 13.5–17.5)
MCH RBC QN AUTO: 30.1 PG (ref 28–32)
MCV RBC AUTO: 91 FL (ref 80–100)
NRBC BLD QL AUTO: 0 %
POTASSIUM SERPL-SCNC: 4.7 MMOL/L (ref 3.5–5.1)
PROT SERPL-MCNC: 5 G/DL (ref 5.7–8.2)
SODIUM SERPL-SCNC: 134 MMOL/L (ref 136–145)

## 2024-12-31 NOTE — [facility]
Discharge Summary


Date of Admission


Resident Creating Document:  GIOVANY VELASQUEZ


Dec 27, 2024 at 21:19





Date of Discharge:


Dec 31, 2024





Admitting Diagnosis


Shortness of breaths





Labs/Diagnostic Data:





                               Laboratory Results








Test


 24


05:53 24


08:00 24


07:18 24


21:37


 


White Blood Count


 7.4 10^3/uL


(4.4-10.8) 


 


 





 


Red Blood Count


 3.56 10^6/uL


(4.5-5.90) 


 


 





 


Hemoglobin


 10.7 g/dL


(13.5-17.5) 


 


 





 


Hematocrit


 32.4 %


(41.0-53.0) 


 


 





 


Mean Corpuscular Volume


 91.0 fL


(80.0-100.0) 


 


 





 


Mean Corpuscular Hemoglobin


 30.1 pg


(28.0-32.0) 


 


 





 


Mean Corpuscular Hemoglobin


Concent 33.1 g/dL


(32.0-36.0) 


 


 





 


Red Cell Distribution Width


 13.7 %


(11.8-14.3) 


 


 





 


Platelet Count


 405 10^3/uL


(140-450) 


 


 





 


Mean Platelet Volume


 7.0 fL


(6.9-10.8) 


 


 





 


Neutrophils (%) (Auto)


 66.8 %


(37.0-80.0) 


 


 





 


Lymphocytes (%) (Auto)


 22.0 %


(10.0-50.0) 


 


 





 


Monocytes (%) (Auto)


 10.9 %


(0.0-12.0) 


 


 





 


Eosinophils (%) (Auto)


 0.2 %


(0.0-7.0) 


 


 





 


Basophils (%) (Auto)


 0.1 %


(0.0-2.0) 


 


 





 


Neutrophils # (Auto)


 4.9 10 ^3/uL


(1.6-8.6) 


 


 





 


Lymphocytes # (Auto)


 1.6 10 ^3/uL


(0.4-5.4) 


 


 





 


Monocytes # (Auto)


 0.8 10 ^3/uL


(0-1.3) 


 


 





 


Eosinophils # (Auto)


 0 10 ^3/uL


(0-0.8) 


 


 





 


Basophils # (Auto)


 0 10 ^3/uL


(0-0.2) 


 


 





 


Nucleated Red Blood Cells 0.0 %    


 


Sodium Level


 134 mmol/L


(136-145) 


 


 





 


Potassium Level


 4.7 mmol/L


(3.5-5.1) 


 


 





 


Chloride Level


 93 mmol/L


() 


 


 





 


Carbon Dioxide Level


 39 mmol/L


(20-31) 


 


 





 


Anion Gap 2 (5-15)    


 


Blood Urea Nitrogen


 17 mg/dL


(9-23) 


 


 





 


Creatinine


 0.78 mg/dL


(0.700-1.30) 


 


 





 


Glomerular Filtration Rate


Calc 100 mL/min


(>90) 


 


 





 


BUN/Creatinine Ratio


 21.8


(10.0-20.0) 


 


 





 


Serum Glucose


 96 mg/dL


() 


 


 





 


Calcium Level


 9.4 mg/dL


(8.7-10.4) 


 


 





 


Total Bilirubin


 0.2 mg/dL


(0.2-1.0) 


 


 





 


Aspartate Amino Transferase


(AST) 10 U/L (13-40) 


 


 


 





 


Alanine Aminotransferase (ALT) 11 U/L (7-40)    


 


Alkaline Phosphatase


 75 U/L


() 


 


 





 


Total Protein


 5.0 g/dL


(5.7-8.2) 


 


 





 


Albumin


 3.3 g/dL


(3.2-4.8) 


 


 





 


Urine Opiates Screen  Neg (NEGATIVE)   


 


Urine Fentanyl Screen  Neg (NEGATIVE)   


 


Urine Barbiturates Screen  Neg (NEGATIVE)   


 


Urine Phencyclidine Screen  Neg (NEGATIVE)   


 


Urine Amphetamines Screen  Neg (NEGATIVE)   


 


Urine Benzodiazepines Screen  Neg (NEGATIVE)   


 


Urine Cocaine Screen  Neg (NEGATIVE)   


 


Urine Cannabinoids Screen  Neg (NEGATIVE)   


 


D-Dimer, Quantitative


 


 


 0.96 mg/L FEU


(0.0-0.49) 





 


Direct Bilirubin


 


 


 0.1 mg/dL


(<0.3) 





 


Hemoglobin A1c


 


 


 


 6.3 % A1C


(<5.7)


 


Vitamin D 25-Hydroxy


 


 


 


 66.5 ng/mL


(30.0-100)


 


Test


 24


20:00 24


16:05 24


15:22 24


12:08


 


Urine Color


 Light-yellow


(Yellow) 


 


 





 


Urine Clarity Clear (Clear)    


 


Urine pH 5.5 (5.0-9.0)    


 


Urine Specific Gravity


 1.007


(1.001-1.035) 


 


 





 


Urine Protein


 Negative


(Negative) 


 


 





 


Urine Ketones


 Negative


(Negative) 


 


 





 


Urine Blood


 Negative /uL


(Negative) 


 


 





 


Urine Nitrite


 Negative


(Negative) 


 


 





 


Urine Bilirubin


 Negative


(Negative) 


 


 





 


Urine Urobilinogen


 Normal mg/dL


(Negative) 


 


 





 


Urine Leukocyte Esterase


 Negative /uL


(Negative) 


 


 





 


Urine RBC


 <1 /hpf (0 -


3) 


 


 





 


Urine WBC


 <1 /hpf (0 -


3) 


 


 





 


Urine Squamous Epithelial


Cells Few /hpf (<5) 


 


 


 





 


Urine Bacteria


 None seen /hpf


(None Seen) 


 


 





 


Urine Mucus


 Few (None


Seen) 


 


 





 


Urine Glucose


 Normal mg/dL


(Normal) 


 


 





 


Influenza Type A Antigen


 


 Negative


(Negative) 


 





 


Influenza Type B Antigen


 


 Negative


(Negative) 


 





 


SARS-CoV-2 Antigen (Rapid)


 


 Positive


(NEGATIVE) 


 





 


Troponin I High Sensitivity


 


 


 12 ng/L


(</=54) 





 


Thyroid Stimulating Hormone


(TSH) 


 


 0.61 uIU/mL


(0.55-4.78) 





 


Plasma/Serum Blood Alcohol


 


 


 < 3.0 mg/dL


(<10) 





 


B-Type Natriuretic Peptide


 


 


 


 60.03 pg/mL


(0-100)





                             Other Laboratory Tests


24 05:53











Brief Hx & Hospital Course:


This is a 64-year-old male with a past medical history of hypertension, COPD on 

home oxygen, and leukemia on ibrutinib, who presented to the ED with a chief 

complaint of shortness of breath and productive cough for 3 days. He was 

diagnosed with pneumonia 3 weeks ago and was on oral antibiotics for 10 days. 

One week ago, he was diagnosed with COVID. For the last 3 days, he has had a 

cough with yellowish sputum and worsening shortness of breath, prompting this 

visit. He denies chest pain, dizziness, diaphoresis, abdominal pain, nausea, 

vomiting, dysuria, hematuria, fever, malaise, weakness, sick contacts, or any 

change in bowel movements. His past surgical history is noncontributory, and his

family history is noncontributing. Socially, he lives with his brother, smokes 

4-6 cigarettes daily, drinks 3-4 beers, and denies any other drug use. His home 

medications include ibrutinib, and he has an allergy to aspirin.





Chest x-ray showed bilateral hyperinflated lung, with prominent bronchovascular 

marking.  The patient was treated on the line of acute on chronic 

hypoxic/hypercarbic respiratory failure due to pneumonia/COVID-19.  The patient 

was given empiric antibiotic of ceftriaxone, azithromycin, IV remdesivir 100 mg 

daily, nebulized with albuterol, vitamin-C and tablet zinc, and oxygen was given

through nasal cannula.  Electrolyte imbalance including hyponatremia, vitamin 

deficiency were supplemented.  Home medication for the hypertension and 

hyperlipidemia including simvastatin, lisinopril and amlodipine were continued. 

Patient was also counseled for the current smoking.





On , the patient was feeling better since admission, patient was clinically

and hemodynamically was stable.  Patient was able to maintain oxygen saturation 

with 2 L of oxygen (the home oxygen amount), discharge plan discussed with the 

patient and the patient was discharged.  





Discharge plan: 


Follow up with the PCP within 1 week after discharge.  


Follow up with the Oncology on outpatient basis.  


Tablet azithromycin 500 mg daily for 5 days 


Tablet prednisone 40 mg daily for 5 days 


Follow up with the DC clinic within 1 week of the discharge





Operations or Procedures


                             [place] [address]


                              Ph: [phone]





                               DIAGNOSTIC IMAGING


                      Diagnostic Imaging Report : [id number]


                                     Signed








PATIENT: REINALDO LINDO    ACCT: [id number]        UNIT: [id number]


: 1960           LOC: EAST                 ROOM / BED: 0239 / A


AGE / SEX: 64 / M         ADM STATUS: ADM IN        SERVICE DT: [id number]





ORDERING PHYSICIAN: GIOVANY VELASQUEZ


PROCEDURE(s): CXR1 - CHEST XRAY 1 VIEW


REASON: pNEUMONIA


ORDER NUMBER(s): [id number], ACCESSION NUMBER(s): [id number]








CHEST RADIOGRAPH





Indication: pNEUMONIA





Technique: Single frontal view of the chest was obtained





COMPARISON: XY CHEST PORTABLE on DOS: 24





FINDINGS: 





Lines and Tubes: None





Lungs: Clear





Pleura: No effusion.





No pneumothorax. 





Cardiomediastinal contours: Unremarkable





Bones: Unremarkable





IMPRESSION: 





No acute disease. 





Electronically Signed by: Rory Adams at 2024 09:34:35 AM











DICTATED BY: RORY ADAMS MD


DICTATED DATE/TIME: 24





SIGNED BY: RORY ADAMS MD


SIGNED DATE/TIME: 24





CC: 


                             [place] [address]


                              Ph: [phone]





                               DIAGNOSTIC IMAGING


                      Diagnostic Imaging Report : [id number]


                                     Signed








PATIENT: REINALDO LINDO    ACCT: [id number]        UNIT: [id number]


: 1960           LOC: OVERFLOW             ROOM / BED: 1009ER / A


AGE / SEX: 64 / M         ADM STATUS: ADM IN        SERVICE DT: 24 [id number]





ORDERING PHYSICIAN: CRISTI CASTELLANO


PROCEDURE(s): BLDVT - BiLat Lower DVT


REASON: High risk of DVT / PE 


ORDER NUMBER(s): [id number], ACCESSION NUMBER(s): [id number]








Bilateral lower extremity venous duplex





Clinical History: High risk of DVT / PE





Comparison: None





Technique: 





Duplex doppler evaluation of the deep venous systems of both lower extremities 

from the common femoral veins to the popliteal veins including color doppler and

spectral/pulsed waveform analysis was performed.





Findings: 





RIGHT SIDE: 





The common femoral vein demonstrates appropriate compressibility and waveform 

variability.





There is compressibility/patency of the great saphenous vein at the proximal 

thigh.





The femoral vein demonstrates appropriate compressibility and waveform 

variability.





The deep femoral vein demonstrates appropriate compressibility and waveform 

variability.





The popliteal vein demonstrates appropriate compressibility and waveform 

variability.





There is normal compressibility at the tibioperoneal trunk.





LEFT SIDE: 





The common femoral vein demonstrates appropriate compressibility and waveform 

variability.





There is compressibility/patency of the great saphenous vein at the proximal 

thigh.





The femoral vein demonstrates appropriate compressibility and waveform 

variability.





The deep femoral vein demonstrates appropriate compressibility and waveform 

variability.





The popliteal vein demonstrates appropriate compressibility and waveform 

variability.





There is normal compressibility at the tibioperoneal trunk.





Impression: 





1. No right or left femoropopliteal venous thrombosis.





 





Electronically Signed by: Kendal Hardin at 2024 11:17:02 AM











DICTATED BY: KENDAL HARDIN MD


DICTATED DATE/TIME: 24 111





SIGNED BY: KENDAL HARDIN MD


SIGNED DATE/TIME: 24 111





CC:





Condition at Discharge:


Good





Final Diagnosis/Problems List





Acute on chronic hypoxic/hypercarbic respiratory failure, likely due to


pneumonia/COVID-19/COPD exacerbation


Sepsis, likely due to COVID-19/pneumonia 


Pneumonia, likely due to Gram-positive Gram-negative/COVID-19


COVID-19 


Acute exacerbation of COPD


Cough, likely due to COVID-19/COPD exacerbation


Prediabetes, Hb A1c is 6.3 


Vitamin-D deficiency, repleted 


Mild hyponatremia, monitoring 


Hypertension 


Current smoker


Dyslipidemia


Osteoarthritis 


Cervical with the pain, likely due to osteoarthritis 


Ruled out DVT, raised D-dimer


Mild anemia normocytic normochromic


Raised D-dimer, likely due to COVID-19





Discharge Disposition:


Home





Discharge Instruct/Medications


Diet:  Consistent carbohydrate


Activity:  No Restrictions, As Tolerated


Follow Up/Referral:  


follow up with the PCP within one week after discharge





follow up with the oncology on outpatient basis





follow up on discharge clinic within one week after discharge


Medications:  


Tab Azithromycin daily for 5 days





tab Prednisone for 5 days





contine home meds


Discharge Statement:


"Patient was advised to return to the ER or call 911 if any headaches, 

dizziness, shortness of breath, chest pain, abdominal pain, bleeding, fevers, or

worsening of medical condition.





Patient was counseled about treatment plan, medications, possible side effects, 

patientverbalized understanding. All questions were answered to the best of my 

ability.





This discharge took greater then 30 minutes in planning, reviewing 

documentation, counseling the patient, and discussing with other team members."





ASSESSMENT


Acute HYpoxic respiratory failure due to copd exacerbation











GIOVANY VELASQUEZ        Dec 31, 2024 15:56

## 2024-12-31 NOTE — [facility]
CHEST RADIOGRAPH



Indication: pNEUMONIA



Technique: Single frontal view of the chest was obtained



COMPARISON: XY CHEST PORTABLE on DOS: 12/27/24



FINDINGS: 



Lines and Tubes: None



Lungs: Clear



Pleura: No effusion.



No pneumothorax. 



Cardiomediastinal contours: Unremarkable



Bones: Unremarkable



IMPRESSION: 



No acute disease. 



Electronically Signed by: Rory Sweet at 12/31/2024 09:34:35 AM

## 2025-06-19 ENCOUNTER — HOSPITAL ENCOUNTER (INPATIENT)
Dept: HOSPITAL 15 - ER | Age: 65
LOS: 3 days | Discharge: HOME | DRG: 871 | End: 2025-06-22
Attending: STUDENT IN AN ORGANIZED HEALTH CARE EDUCATION/TRAINING PROGRAM | Admitting: STUDENT IN AN ORGANIZED HEALTH CARE EDUCATION/TRAINING PROGRAM
Payer: COMMERCIAL

## 2025-06-19 VITALS
DIASTOLIC BLOOD PRESSURE: 80 MMHG | HEART RATE: 80 BPM | RESPIRATION RATE: 18 BRPM | TEMPERATURE: 98 F | SYSTOLIC BLOOD PRESSURE: 125 MMHG | OXYGEN SATURATION: 98 %

## 2025-06-19 VITALS — WEIGHT: 111.11 LBS | HEIGHT: 68 IN | BODY MASS INDEX: 16.84 KG/M2

## 2025-06-19 DIAGNOSIS — Z85.6: ICD-10-CM

## 2025-06-19 DIAGNOSIS — Z99.81: ICD-10-CM

## 2025-06-19 DIAGNOSIS — J44.1: ICD-10-CM

## 2025-06-19 DIAGNOSIS — Z79.899: ICD-10-CM

## 2025-06-19 DIAGNOSIS — F12.20: ICD-10-CM

## 2025-06-19 DIAGNOSIS — Z88.6: ICD-10-CM

## 2025-06-19 DIAGNOSIS — J15.69: ICD-10-CM

## 2025-06-19 DIAGNOSIS — J96.21: ICD-10-CM

## 2025-06-19 DIAGNOSIS — Z20.822: ICD-10-CM

## 2025-06-19 DIAGNOSIS — A41.50: Primary | ICD-10-CM

## 2025-06-19 DIAGNOSIS — I10: ICD-10-CM

## 2025-06-19 DIAGNOSIS — E55.9: ICD-10-CM

## 2025-06-19 DIAGNOSIS — E78.5: ICD-10-CM

## 2025-06-19 DIAGNOSIS — J15.9: ICD-10-CM

## 2025-06-19 DIAGNOSIS — F17.210: ICD-10-CM

## 2025-06-19 DIAGNOSIS — M54.9: ICD-10-CM

## 2025-06-19 DIAGNOSIS — G89.29: ICD-10-CM

## 2025-06-19 LAB
ALBUMIN SERPL-MCNC: 4.4 G/DL (ref 3.2–4.8)
ALP SERPL-CCNC: 108 U/L (ref 46–116)
ALT SERPL-CCNC: < 9 U/L (ref 7–40)
ANION GAP SERPL CALCULATED.3IONS-SCNC: 9 MMOL/L (ref 5–15)
AST SERPL-CCNC: 17 U/L (ref ?–34)
BASOPHILS # BLD AUTO: 0.1 10 ^3/UL (ref 0–0.2)
BASOPHILS NFR BLD AUTO: 1.2 % (ref 0–2)
BILIRUB SERPL-MCNC: 0.5 MG/DL (ref 0.2–1)
BUN SERPL-MCNC: 7 MG/DL (ref 9–23)
BUN/CREAT SERPL: 8.2 (ref 10–20)
CALCIUM SERPL-MCNC: 8.7 MG/DL (ref 8.7–10.4)
CHLORIDE SERPL-SCNC: 101 MMOL/L (ref 98–107)
CO2 SERPL-SCNC: 27 MMOL/L (ref 20–31)
CREAT SERPL-MCNC: 0.85 MG/DL (ref 0.7–1.3)
EOSINOPHIL # BLD AUTO: 0 10 ^3/UL (ref 0–0.8)
EOSINOPHIL NFR BLD AUTO: 0.6 % (ref 0–7)
ERYTHROCYTE [DISTWIDTH] IN BLOOD BY AUTOMATED COUNT: 19.3 % (ref 11.8–14.3)
GLUCOSE SERPL-MCNC: 84 MG/DL (ref 74–106)
HCT VFR BLD AUTO: 42.3 % (ref 41–53)
HGB BLD-MCNC: 14 G/DL (ref 13.5–17.5)
LYMPHOCYTES # BLD AUTO: 3.6 10 ^3/UL (ref 0.4–5.4)
LYMPHOCYTES NFR BLD AUTO: 51.5 % (ref 10–50)
MCH RBC QN AUTO: 30.5 PG (ref 28–32)
MCHC RBC AUTO-ENTMCNC: 33.1 G/DL (ref 32–36)
MCV RBC AUTO: 92.2 FL (ref 80–100)
MONOCYTES # BLD AUTO: 0.6 10 ^3/UL (ref 0–1.3)
MONOCYTES NFR BLD AUTO: 9 % (ref 0–12)
NEUTROPHILS # BLD AUTO: 2.6 10 ^3/UL (ref 1.6–8.6)
NEUTROPHILS NFR BLD AUTO: 37.7 % (ref 37–80)
NRBC BLD QL AUTO: 0 %
PLATELET # BLD AUTO: 266 10^3/UL (ref 140–450)
POTASSIUM SERPL-SCNC: 4.5 MMOL/L (ref 3.5–5.1)
PROT SERPL-MCNC: 6.3 G/DL (ref 5.7–8.2)
RBC # BLD AUTO: 4.59 10^6/UL (ref 4.5–5.9)
SODIUM SERPL-SCNC: 137 MMOL/L (ref 136–145)
WBC # BLD AUTO: 7 10^3/UL (ref 4.4–10.8)

## 2025-06-19 PROCEDURE — 81001 URINALYSIS AUTO W/SCOPE: CPT

## 2025-06-19 PROCEDURE — 85025 COMPLETE CBC W/AUTO DIFF WBC: CPT

## 2025-06-19 PROCEDURE — 87070 CULTURE OTHR SPECIMN AEROBIC: CPT

## 2025-06-19 PROCEDURE — 85730 THROMBOPLASTIN TIME PARTIAL: CPT

## 2025-06-19 PROCEDURE — 93005 ELECTROCARDIOGRAM TRACING: CPT

## 2025-06-19 PROCEDURE — 82607 VITAMIN B-12: CPT

## 2025-06-19 PROCEDURE — 83605 ASSAY OF LACTIC ACID: CPT

## 2025-06-19 PROCEDURE — 87205 SMEAR GRAM STAIN: CPT

## 2025-06-19 PROCEDURE — 84443 ASSAY THYROID STIM HORMONE: CPT

## 2025-06-19 PROCEDURE — 71045 X-RAY EXAM CHEST 1 VIEW: CPT

## 2025-06-19 PROCEDURE — 94640 AIRWAY INHALATION TREATMENT: CPT

## 2025-06-19 PROCEDURE — 83735 ASSAY OF MAGNESIUM: CPT

## 2025-06-19 PROCEDURE — 84484 ASSAY OF TROPONIN QUANT: CPT

## 2025-06-19 PROCEDURE — 36415 COLL VENOUS BLD VENIPUNCTURE: CPT

## 2025-06-19 PROCEDURE — 83880 ASSAY OF NATRIURETIC PEPTIDE: CPT

## 2025-06-19 PROCEDURE — 85610 PROTHROMBIN TIME: CPT

## 2025-06-19 PROCEDURE — 87804 INFLUENZA ASSAY W/OPTIC: CPT

## 2025-06-19 PROCEDURE — 80307 DRUG TEST PRSMV CHEM ANLYZR: CPT

## 2025-06-19 PROCEDURE — 84480 ASSAY TRIIODOTHYRONINE (T3): CPT

## 2025-06-19 PROCEDURE — 80053 COMPREHEN METABOLIC PANEL: CPT

## 2025-06-19 PROCEDURE — 87426 SARSCOV CORONAVIRUS AG IA: CPT

## 2025-06-19 PROCEDURE — 82306 VITAMIN D 25 HYDROXY: CPT

## 2025-06-19 PROCEDURE — 80048 BASIC METABOLIC PNL TOTAL CA: CPT

## 2025-06-19 PROCEDURE — 93306 TTE W/DOPPLER COMPLETE: CPT

## 2025-06-19 PROCEDURE — 84439 ASSAY OF FREE THYROXINE: CPT

## 2025-06-19 PROCEDURE — 96374 THER/PROPH/DIAG INJ IV PUSH: CPT

## 2025-06-19 RX ADMIN — ALBUTEROL SULFATE ONE MG: 2.5 SOLUTION RESPIRATORY (INHALATION) at 20:31

## 2025-06-19 RX ADMIN — IPRATROPIUM BROMIDE ONE MG: 0.5 SOLUTION RESPIRATORY (INHALATION) at 20:31

## 2025-06-19 RX ADMIN — METHYLPREDNISOLONE SODIUM SUCCINATE ONE MG: 125 INJECTION, POWDER, FOR SOLUTION INTRAMUSCULAR; INTRAVENOUS at 19:04

## 2025-06-20 VITALS
SYSTOLIC BLOOD PRESSURE: 126 MMHG | OXYGEN SATURATION: 96 % | TEMPERATURE: 98 F | DIASTOLIC BLOOD PRESSURE: 69 MMHG | RESPIRATION RATE: 18 BRPM | HEART RATE: 73 BPM

## 2025-06-20 VITALS — RESPIRATION RATE: 16 BRPM | OXYGEN SATURATION: 100 % | HEART RATE: 73 BPM

## 2025-06-20 VITALS — RESPIRATION RATE: 21 BRPM | OXYGEN SATURATION: 97 % | HEART RATE: 77 BPM

## 2025-06-20 VITALS — HEART RATE: 76 BPM | RESPIRATION RATE: 16 BRPM | OXYGEN SATURATION: 94 %

## 2025-06-20 VITALS — HEART RATE: 81 BPM | RESPIRATION RATE: 18 BRPM | OXYGEN SATURATION: 95 %

## 2025-06-20 VITALS — RESPIRATION RATE: 22 BRPM | OXYGEN SATURATION: 93 % | HEART RATE: 83 BPM

## 2025-06-20 VITALS — HEART RATE: 75 BPM | RESPIRATION RATE: 18 BRPM | OXYGEN SATURATION: 100 %

## 2025-06-20 VITALS
HEART RATE: 74 BPM | SYSTOLIC BLOOD PRESSURE: 124 MMHG | TEMPERATURE: 98.3 F | DIASTOLIC BLOOD PRESSURE: 72 MMHG | OXYGEN SATURATION: 96 %

## 2025-06-20 VITALS — OXYGEN SATURATION: 95 %

## 2025-06-20 VITALS — RESPIRATION RATE: 15 BRPM | HEART RATE: 71 BPM | OXYGEN SATURATION: 94 %

## 2025-06-20 LAB
AMPHETAMINES UR QL SCN: (no result)
APTT PPP: 28.8 SEC (ref 24.5–34.5)
BACTERIA UR QL AUTO: (no result) /HPF
BARBITURATES UR QL SCN: (no result)
BENZODIAZ UR QL SCN: (no result)
BZE UR QL SCN: (no result)
CANNABINOIDS UR QL SCN: (no result)
CASTS URNS QL MICRO: (no result) /LPF
CELLULAR CAST: (no result) /HPF
CLARITY UR: CLEAR
COARSE GRAN CASTS URNS QL MICRO: (no result) /LPF
COLOR UR: YELLOW
CRYSTALS UR QL AUTO: (no result) /HPF
CRYSTALS URNS MICRO: (no result) /HPF
FINE GRAN CASTS #/AREA URNS AUTO: (no result) /LPF
FLUAV AG UPPER RESP QL IA.RAPID: NEGATIVE
FLUBV AG SPEC QL: NEGATIVE
HGB UR QL STRIP: NEGATIVE /UL
HYALINE CASTS UR QL AUTO: (no result) /LPF (ref 0–2)
INR PPP: 1 (ref 0.9–1.15)
LEUCINE CRYSTALS [PRESENCE] IN URINE BY COMPUTER ASSISTED METHOD: (no result) /HPF
MUCOUS THREADS UR QL AUTO: (no result)
MUCOUS THREADS UR QL AUTO: (no result) /HPF (ref ?–5)
OPIATES UR QL SCN: (no result)
PCP UR QL SCN: (no result)
PH UR: 5.5 [PH] (ref 5–9)
PROT UR STRIP.AUTO-MCNC: (no result) MG/DL
PROTHROMBIN TIME: 10.6 SEC (ref 9.3–11.8)
SARS-COV+SARS-COV-2 AG RESP QL IA.RAPID: NEGATIVE
SP GR UR STRIP: 1.02 (ref 1–1.03)
SPERM UR QL AUTO: PRESENT /HPF
STARCH GRANULES URNS QL MICRO: (no result)
URINE SQUAMOUS EPITHELIAL CELL: (no result) /HPF (ref ?–5)
UROBILINOGEN UR QL: NORMAL MG/DL
WBC #/AREA URNS AUTO: 1 /HPF (ref 0–3)
WBC CLUMPS UR QL AUTO: (no result) /HPF
YEAST # UR AUTO: (no result) /HPF
YEAST HYPHAE UR QL COMP ASSIST: (no result) /HPF

## 2025-06-20 RX ADMIN — METHYLPREDNISOLONE SODIUM SUCCINATE SCH MG: 40 INJECTION, POWDER, FOR SOLUTION INTRAMUSCULAR; INTRAVENOUS at 10:30

## 2025-06-20 RX ADMIN — HYDROCODONE BITARTRATE AND ACETAMINOPHEN PRN TAB: 10; 325 TABLET ORAL at 10:29

## 2025-06-20 RX ADMIN — LEVALBUTEROL SCH MG: 1.25 SOLUTION RESPIRATORY (INHALATION) at 00:31

## 2025-06-20 RX ADMIN — ENOXAPARIN SODIUM SCH MG: 40 INJECTION SUBCUTANEOUS at 10:30

## 2025-06-20 RX ADMIN — DOXYCYCLINE SCH MG: 100 TABLET ORAL at 10:29

## 2025-06-20 RX ADMIN — Medication SCH UNIT: at 15:20

## 2025-06-20 RX ADMIN — AZITHROMYCIN DIHYDRATE ONE MLS/HR: 500 INJECTION, POWDER, LYOPHILIZED, FOR SOLUTION INTRAVENOUS at 15:20

## 2025-06-20 RX ADMIN — PANTOPRAZOLE SODIUM SCH MG: 40 TABLET, DELAYED RELEASE ORAL at 05:25

## 2025-06-20 RX ADMIN — DOXYCYCLINE ONE MG: 100 TABLET ORAL at 02:24

## 2025-06-20 RX ADMIN — IPRATROPIUM BROMIDE SCH MG: 0.5 SOLUTION RESPIRATORY (INHALATION) at 00:31

## 2025-06-20 RX ADMIN — ACETYLCYSTEINE SCH MG: 100 INHALANT RESPIRATORY (INHALATION) at 19:14

## 2025-06-20 RX ADMIN — CEFTRIAXONE SODIUM ONE MLS/HR: 1 INJECTION, POWDER, FOR SOLUTION INTRAMUSCULAR; INTRAVENOUS at 02:24

## 2025-06-21 VITALS
SYSTOLIC BLOOD PRESSURE: 133 MMHG | RESPIRATION RATE: 18 BRPM | DIASTOLIC BLOOD PRESSURE: 68 MMHG | TEMPERATURE: 97.9 F | HEART RATE: 66 BPM | OXYGEN SATURATION: 94 %

## 2025-06-21 VITALS — RESPIRATION RATE: 18 BRPM | OXYGEN SATURATION: 93 % | HEART RATE: 79 BPM

## 2025-06-21 VITALS — HEART RATE: 82 BPM | OXYGEN SATURATION: 95 % | RESPIRATION RATE: 18 BRPM

## 2025-06-21 VITALS
OXYGEN SATURATION: 93 % | SYSTOLIC BLOOD PRESSURE: 121 MMHG | HEART RATE: 96 BPM | RESPIRATION RATE: 17 BRPM | TEMPERATURE: 97.9 F | DIASTOLIC BLOOD PRESSURE: 83 MMHG

## 2025-06-21 VITALS — OXYGEN SATURATION: 97 % | RESPIRATION RATE: 20 BRPM | HEART RATE: 73 BPM

## 2025-06-21 VITALS — RESPIRATION RATE: 18 BRPM | HEART RATE: 84 BPM | OXYGEN SATURATION: 100 %

## 2025-06-21 VITALS
TEMPERATURE: 98.2 F | HEART RATE: 79 BPM | RESPIRATION RATE: 18 BRPM | OXYGEN SATURATION: 93 % | SYSTOLIC BLOOD PRESSURE: 144 MMHG | DIASTOLIC BLOOD PRESSURE: 80 MMHG

## 2025-06-21 VITALS
TEMPERATURE: 97.8 F | SYSTOLIC BLOOD PRESSURE: 142 MMHG | RESPIRATION RATE: 17 BRPM | DIASTOLIC BLOOD PRESSURE: 82 MMHG | HEART RATE: 79 BPM | OXYGEN SATURATION: 94 %

## 2025-06-21 VITALS — RESPIRATION RATE: 20 BRPM | HEART RATE: 74 BPM | OXYGEN SATURATION: 100 %

## 2025-06-21 VITALS
SYSTOLIC BLOOD PRESSURE: 98 MMHG | OXYGEN SATURATION: 95 % | RESPIRATION RATE: 18 BRPM | TEMPERATURE: 98 F | DIASTOLIC BLOOD PRESSURE: 62 MMHG | HEART RATE: 75 BPM

## 2025-06-21 VITALS
TEMPERATURE: 97.9 F | HEART RATE: 79 BPM | OXYGEN SATURATION: 93 % | DIASTOLIC BLOOD PRESSURE: 89 MMHG | RESPIRATION RATE: 16 BRPM | SYSTOLIC BLOOD PRESSURE: 148 MMHG

## 2025-06-21 VITALS — OXYGEN SATURATION: 93 % | RESPIRATION RATE: 18 BRPM | HEART RATE: 78 BPM

## 2025-06-21 VITALS — RESPIRATION RATE: 16 BRPM | HEART RATE: 77 BPM | OXYGEN SATURATION: 100 %

## 2025-06-21 VITALS — OXYGEN SATURATION: 100 % | HEART RATE: 85 BPM | RESPIRATION RATE: 16 BRPM

## 2025-06-21 VITALS — OXYGEN SATURATION: 95 %

## 2025-06-21 VITALS — HEART RATE: 76 BPM | OXYGEN SATURATION: 98 % | RESPIRATION RATE: 18 BRPM

## 2025-06-21 LAB
ANION GAP SERPL CALCULATED.3IONS-SCNC: 7 MMOL/L (ref 5–15)
BASOPHILS # BLD AUTO: 0 10 ^3/UL (ref 0–0.2)
BASOPHILS NFR BLD AUTO: 0.2 % (ref 0–2)
BUN SERPL-MCNC: 19 MG/DL (ref 9–23)
BUN/CREAT SERPL: 18.3 (ref 10–20)
CALCIUM SERPL-MCNC: 9.2 MG/DL (ref 8.7–10.4)
CHLORIDE SERPL-SCNC: 104 MMOL/L (ref 98–107)
CO2 SERPL-SCNC: 30 MMOL/L (ref 20–31)
CREAT SERPL-MCNC: 1.04 MG/DL (ref 0.7–1.3)
EOSINOPHIL # BLD AUTO: 0 10 ^3/UL (ref 0–0.8)
EOSINOPHIL NFR BLD AUTO: 0.1 % (ref 0–7)
ERYTHROCYTE [DISTWIDTH] IN BLOOD BY AUTOMATED COUNT: 19.2 % (ref 11.8–14.3)
GLUCOSE SERPL-MCNC: 118 MG/DL (ref 74–106)
HCT VFR BLD AUTO: 40 % (ref 41–53)
HGB BLD-MCNC: 13.3 G/DL (ref 13.5–17.5)
LYMPHOCYTES # BLD AUTO: 1.9 10 ^3/UL (ref 0.4–5.4)
LYMPHOCYTES NFR BLD AUTO: 28.8 % (ref 10–50)
MCH RBC QN AUTO: 30.5 PG (ref 28–32)
MCHC RBC AUTO-ENTMCNC: 33.3 G/DL (ref 32–36)
MCV RBC AUTO: 91.7 FL (ref 80–100)
MONOCYTES # BLD AUTO: 0.7 10 ^3/UL (ref 0–1.3)
MONOCYTES NFR BLD AUTO: 11 % (ref 0–12)
NEUTROPHILS # BLD AUTO: 4 10 ^3/UL (ref 1.6–8.6)
NEUTROPHILS NFR BLD AUTO: 59.9 % (ref 37–80)
NRBC BLD QL AUTO: 0 %
PLATELET # BLD AUTO: 254 10^3/UL (ref 140–450)
POTASSIUM SERPL-SCNC: 4.2 MMOL/L (ref 3.5–5.1)
RBC # BLD AUTO: 4.37 10^6/UL (ref 4.5–5.9)
SODIUM SERPL-SCNC: 141 MMOL/L (ref 136–145)
WBC # BLD AUTO: 6.7 10^3/UL (ref 4.4–10.8)

## 2025-06-21 RX ADMIN — CEFTRIAXONE SODIUM SCH MLS/HR: 1 INJECTION, POWDER, FOR SOLUTION INTRAMUSCULAR; INTRAVENOUS at 08:23

## 2025-06-21 RX ADMIN — AZITHROMYCIN DIHYDRATE SCH MLS/HR: 500 INJECTION, POWDER, LYOPHILIZED, FOR SOLUTION INTRAVENOUS at 10:09

## 2025-06-22 VITALS
RESPIRATION RATE: 18 BRPM | OXYGEN SATURATION: 99 % | HEART RATE: 59 BPM | SYSTOLIC BLOOD PRESSURE: 152 MMHG | TEMPERATURE: 97.9 F | DIASTOLIC BLOOD PRESSURE: 88 MMHG

## 2025-06-22 VITALS
TEMPERATURE: 98 F | SYSTOLIC BLOOD PRESSURE: 129 MMHG | OXYGEN SATURATION: 93 % | HEART RATE: 73 BPM | RESPIRATION RATE: 16 BRPM | DIASTOLIC BLOOD PRESSURE: 88 MMHG

## 2025-06-22 VITALS — RESPIRATION RATE: 16 BRPM | OXYGEN SATURATION: 100 % | HEART RATE: 74 BPM

## 2025-06-22 VITALS
OXYGEN SATURATION: 97 % | HEART RATE: 74 BPM | RESPIRATION RATE: 18 BRPM | TEMPERATURE: 97.9 F | SYSTOLIC BLOOD PRESSURE: 139 MMHG | DIASTOLIC BLOOD PRESSURE: 82 MMHG

## 2025-06-22 VITALS — OXYGEN SATURATION: 97 %

## 2025-06-22 VITALS — RESPIRATION RATE: 18 BRPM | HEART RATE: 81 BPM | OXYGEN SATURATION: 97 %

## 2025-06-22 VITALS — RESPIRATION RATE: 18 BRPM | HEART RATE: 78 BPM | OXYGEN SATURATION: 99 %

## 2025-06-22 VITALS
OXYGEN SATURATION: 92 % | SYSTOLIC BLOOD PRESSURE: 141 MMHG | RESPIRATION RATE: 16 BRPM | HEART RATE: 82 BPM | DIASTOLIC BLOOD PRESSURE: 81 MMHG | TEMPERATURE: 98.8 F

## 2025-06-22 VITALS — RESPIRATION RATE: 18 BRPM | OXYGEN SATURATION: 95 % | HEART RATE: 80 BPM

## 2025-06-22 VITALS
DIASTOLIC BLOOD PRESSURE: 81 MMHG | OXYGEN SATURATION: 95 % | TEMPERATURE: 97.5 F | HEART RATE: 79 BPM | RESPIRATION RATE: 18 BRPM | SYSTOLIC BLOOD PRESSURE: 139 MMHG

## 2025-06-22 VITALS — HEART RATE: 81 BPM | RESPIRATION RATE: 18 BRPM

## 2025-06-22 VITALS — HEART RATE: 71 BPM

## 2025-06-22 VITALS — OXYGEN SATURATION: 99 % | RESPIRATION RATE: 18 BRPM | HEART RATE: 87 BPM

## 2025-06-22 VITALS — TEMPERATURE: 97.88 F

## 2025-06-22 LAB
ANION GAP SERPL CALCULATED.3IONS-SCNC: 8 MMOL/L (ref 5–15)
BASOPHILS # BLD AUTO: 0 10 ^3/UL (ref 0–0.2)
BASOPHILS NFR BLD AUTO: 0.2 % (ref 0–2)
BUN SERPL-MCNC: 17 MG/DL (ref 9–23)
BUN/CREAT SERPL: 16.3 (ref 10–20)
CALCIUM SERPL-MCNC: 9.5 MG/DL (ref 8.7–10.4)
CHLORIDE SERPL-SCNC: 101 MMOL/L (ref 98–107)
CO2 SERPL-SCNC: 30 MMOL/L (ref 20–31)
CREAT SERPL-MCNC: 1.04 MG/DL (ref 0.7–1.3)
EOSINOPHIL # BLD AUTO: 0 10 ^3/UL (ref 0–0.8)
EOSINOPHIL NFR BLD AUTO: 0.3 % (ref 0–7)
ERYTHROCYTE [DISTWIDTH] IN BLOOD BY AUTOMATED COUNT: 18.7 % (ref 11.8–14.3)
GLUCOSE SERPL-MCNC: 128 MG/DL (ref 74–106)
HCT VFR BLD AUTO: 40.2 % (ref 41–53)
HGB BLD-MCNC: 13.3 G/DL (ref 13.5–17.5)
LYMPHOCYTES # BLD AUTO: 2.3 10 ^3/UL (ref 0.4–5.4)
LYMPHOCYTES NFR BLD AUTO: 35.6 % (ref 10–50)
MCH RBC QN AUTO: 30.6 PG (ref 28–32)
MCHC RBC AUTO-ENTMCNC: 33.1 G/DL (ref 32–36)
MCV RBC AUTO: 92.5 FL (ref 80–100)
MONOCYTES # BLD AUTO: 0.6 10 ^3/UL (ref 0–1.3)
MONOCYTES NFR BLD AUTO: 8.8 % (ref 0–12)
NEUTROPHILS # BLD AUTO: 3.6 10 ^3/UL (ref 1.6–8.6)
NEUTROPHILS NFR BLD AUTO: 55.1 % (ref 37–80)
NRBC BLD QL AUTO: 0 %
PLATELET # BLD AUTO: 240 10^3/UL (ref 140–450)
POTASSIUM SERPL-SCNC: 3.8 MMOL/L (ref 3.5–5.1)
RBC # BLD AUTO: 4.35 10^6/UL (ref 4.5–5.9)
SODIUM SERPL-SCNC: 139 MMOL/L (ref 136–145)
WBC # BLD AUTO: 6.5 10^3/UL (ref 4.4–10.8)

## 2025-06-22 RX ADMIN — Medication SCH MG: at 13:21

## 2025-06-23 LAB
T3 SERPL-MCNC: 0.66 NG/ML (ref 0.6–1.81)
T4 FREE SERPL-MCNC: 1.24 NG/DL (ref 0.89–1.76)